# Patient Record
Sex: FEMALE | Race: OTHER
[De-identification: names, ages, dates, MRNs, and addresses within clinical notes are randomized per-mention and may not be internally consistent; named-entity substitution may affect disease eponyms.]

---

## 2018-08-24 ENCOUNTER — HOSPITAL ENCOUNTER (EMERGENCY)
Dept: HOSPITAL 72 - EMR | Age: 28
Discharge: HOME | End: 2018-08-24
Payer: COMMERCIAL

## 2018-08-24 VITALS — DIASTOLIC BLOOD PRESSURE: 60 MMHG | SYSTOLIC BLOOD PRESSURE: 96 MMHG

## 2018-08-24 VITALS — DIASTOLIC BLOOD PRESSURE: 64 MMHG | SYSTOLIC BLOOD PRESSURE: 102 MMHG

## 2018-08-24 VITALS — WEIGHT: 150 LBS | BODY MASS INDEX: 24.99 KG/M2 | HEIGHT: 65 IN

## 2018-08-24 VITALS — DIASTOLIC BLOOD PRESSURE: 61 MMHG | SYSTOLIC BLOOD PRESSURE: 93 MMHG

## 2018-08-24 VITALS — DIASTOLIC BLOOD PRESSURE: 117 MMHG | SYSTOLIC BLOOD PRESSURE: 134 MMHG

## 2018-08-24 VITALS — SYSTOLIC BLOOD PRESSURE: 108 MMHG | DIASTOLIC BLOOD PRESSURE: 64 MMHG

## 2018-08-24 DIAGNOSIS — I95.9: ICD-10-CM

## 2018-08-24 DIAGNOSIS — R00.0: ICD-10-CM

## 2018-08-24 DIAGNOSIS — R04.0: ICD-10-CM

## 2018-08-24 DIAGNOSIS — L76.22: Primary | ICD-10-CM

## 2018-08-24 DIAGNOSIS — D64.9: ICD-10-CM

## 2018-08-24 LAB
ADD MANUAL DIFF: NO
ADD MANUAL DIFF: NO
ANION GAP SERPL CALC-SCNC: 10 MMOL/L (ref 5–15)
APTT BLD: 26 SEC (ref 23–33)
BASOPHILS NFR BLD AUTO: 0.5 % (ref 0–2)
BASOPHILS NFR BLD AUTO: 1.1 % (ref 0–2)
BUN SERPL-MCNC: 11 MG/DL (ref 7–18)
CALCIUM SERPL-MCNC: 8.3 MG/DL (ref 8.5–10.1)
CHLORIDE SERPL-SCNC: 105 MMOL/L (ref 98–107)
CO2 SERPL-SCNC: 25 MMOL/L (ref 21–32)
CREAT SERPL-MCNC: 1 MG/DL (ref 0.55–1.3)
EOSINOPHIL NFR BLD AUTO: 0.8 % (ref 0–3)
EOSINOPHIL NFR BLD AUTO: 3.9 % (ref 0–3)
ERYTHROCYTE [DISTWIDTH] IN BLOOD BY AUTOMATED COUNT: 10.9 % (ref 11.6–14.8)
ERYTHROCYTE [DISTWIDTH] IN BLOOD BY AUTOMATED COUNT: 10.9 % (ref 11.6–14.8)
HCT VFR BLD CALC: 26.2 % (ref 37–47)
HCT VFR BLD CALC: 32.3 % (ref 37–47)
HGB BLD-MCNC: 11 G/DL (ref 12–16)
HGB BLD-MCNC: 8.9 G/DL (ref 12–16)
INR PPP: 1 (ref 0.9–1.1)
LYMPHOCYTES NFR BLD AUTO: 10.3 % (ref 20–45)
LYMPHOCYTES NFR BLD AUTO: 53.9 % (ref 20–45)
MCV RBC AUTO: 88 FL (ref 80–99)
MCV RBC AUTO: 88 FL (ref 80–99)
MONOCYTES NFR BLD AUTO: 4.3 % (ref 1–10)
MONOCYTES NFR BLD AUTO: 7.8 % (ref 1–10)
NEUTROPHILS NFR BLD AUTO: 33.4 % (ref 45–75)
NEUTROPHILS NFR BLD AUTO: 84.1 % (ref 45–75)
PLATELET # BLD: 219 K/UL (ref 150–450)
PLATELET # BLD: 328 K/UL (ref 150–450)
POTASSIUM SERPL-SCNC: 3.2 MMOL/L (ref 3.5–5.1)
RBC # BLD AUTO: 2.98 M/UL (ref 4.2–5.4)
RBC # BLD AUTO: 3.68 M/UL (ref 4.2–5.4)
SODIUM SERPL-SCNC: 140 MMOL/L (ref 136–145)
WBC # BLD AUTO: 11.5 K/UL (ref 4.8–10.8)
WBC # BLD AUTO: 16.5 K/UL (ref 4.8–10.8)

## 2018-08-24 PROCEDURE — 86850 RBC ANTIBODY SCREEN: CPT

## 2018-08-24 PROCEDURE — 85730 THROMBOPLASTIN TIME PARTIAL: CPT

## 2018-08-24 PROCEDURE — 86901 BLOOD TYPING SEROLOGIC RH(D): CPT

## 2018-08-24 PROCEDURE — 86900 BLOOD TYPING SEROLOGIC ABO: CPT

## 2018-08-24 PROCEDURE — 96374 THER/PROPH/DIAG INJ IV PUSH: CPT

## 2018-08-24 PROCEDURE — 96361 HYDRATE IV INFUSION ADD-ON: CPT

## 2018-08-24 PROCEDURE — 85025 COMPLETE CBC W/AUTO DIFF WBC: CPT

## 2018-08-24 PROCEDURE — 30901 CONTROL OF NOSEBLEED: CPT

## 2018-08-24 PROCEDURE — 80048 BASIC METABOLIC PNL TOTAL CA: CPT

## 2018-08-24 PROCEDURE — 99284 EMERGENCY DEPT VISIT MOD MDM: CPT

## 2018-08-24 PROCEDURE — 85610 PROTHROMBIN TIME: CPT

## 2018-08-24 PROCEDURE — 36415 COLL VENOUS BLD VENIPUNCTURE: CPT

## 2018-08-24 NOTE — EMERGENCY ROOM REPORT
History of Present Illness


General


Chief Complaint:  Nosebleed


Source:  Patient, Family Member, EMS





Present Illness


HPI


Is a 28-year-old female brought in by EMS for nosebleed.  She is status post 

one week from sinus surgery with septoplasty and submucosal resection of the 

turbinates.  She was doing well until just prior to arrival.  She woke up 

choking and had blood pouring out of her nose and mouth.  This occurred about 

20 minutes prior to arrival.  On arrival patient was in shock with tachycardia, 

heart rate 130s, hypotensive with systolic in the 70s and she was diaphoretic.  

Still has bleeding.  Bleeding was significant.  She soaked up a couple towels.  

Nauseous from the swallowed blood.  No fever chills but no trauma.  No nose 

picking or blowing her nose.


Allergies:  


Coded Allergies:  


     No Known Allergies (Unverified , 8/24/18)





Patient History


Past Medical History:  see triage record, old chart reviewed


Past Surgical History:  other


Pertinent Family History:  none


Social History:  Denies: smoking


Last Menstrual Period:  unknown


Pregnant Now:  No


Immunizations:  other


Reviewed Nursing Documentation:  PMH: Agreed; PSxH: Agreed





Nursing Documentation-PMH


Past Medical History:  No Stated History





Review of Systems


Eye:  Denies: eye pain, blurred vision


ENT:  Reports: other - nosebleed; Denies: ear pain, nose congestion, throat 

swelling


Respiratory:  Denies: cough, shortness of breath


Cardiovascular:  Denies: chest pain, palpitations


Gastrointestinal:  Denies: abdominal pain, diarrhea, nausea, vomiting


Musculoskeletal:  Denies: back pain, joint pain


Skin:  Denies: rash


Neurological:  Denies: headache, numbness


Endocrine:  Denies: increased thirst, increased urine


Hematologic/Lymphatic:  Denies: easy bruising


All Other Systems:  negative except mentioned in HPI





Physical Exam





Vital Signs








  Date Time  Temp Pulse Resp B/P (MAP) Pulse Ox O2 Delivery O2 Flow Rate FiO2


 


8/24/18 02:19 98.0 123 18 134/117 100 Room Air  





 98.1       





vitals showed blood pressure with systolic in the 80s..  Tachycardic


Sp02 EP Interpretation:  reviewed, normal


General Appearance:  alert, moderate distress


Head:  normocephalic, atraumatic


Eyes:  bilateral eye PERRL, bilateral eye EOMI


ENT:  hearing grossly normal, normal pharynx, other - There is oozing of blood 

bilaterally.  There is evidence of posterior bleed to the pharynx.


Neck:  full range of motion, supple, no meningismus


Respiratory:  chest non-tender, lungs clear, normal breath sounds


Cardiovascular #1:  regular rate, rhythm, no murmur


Gastrointestinal:  normal bowel sounds, non tender, no mass, no organomegaly, 

no bruit, non-distended


Musculoskeletal:  back normal, gait/station normal, normal range of motion


Psychiatric:  mood/affect normal


Skin:  warm/dry





Procedures


Additional Procedure


Procedure Narrative


Procedure: Nasal packing


Indication: Epistaxis


Description: Using a Ravi suction catheter, I suction the blood and clot.  

Bleeding appeared to slow down.  I then put in surgicel b/l.  Bleeding 

controlled.  There is no bleeding in the oropharynx.  She tolerated procedure 

without a problem.





Medical Decision Making


Diagnostic Impression:  


 Primary Impression:  


 Severe epistaxis


 Additional Impressions:  


 Post-op bleeding


 Qualified Codes:  L76.22 - Postprocedural hemorrhage of skin and subcutaneous 

tissue following other procedure


 Anemia


 Qualified Codes:  D64.9 - Anemia, unspecified


ER Course


Patient presents with severe epistaxis.  She lost a significant amount of 

blood.  She was diaphoretic and tachycardic and hypotensive.  Blood pressure 

improved after IV fluid.  Heart rate normalized.  Bleeding controlled.  I 

suspect that a scab fell off and there was some arterial bleeding.  This spasm 

down and no longer bleeding.





I paged Dr.Harvey Rosales, ENT.  So far, no call back.





Patient has been stable.  No bleeding for several hours now.  Again, I called 

Dr. Rosales.  Left a message at the office.  Also, Brigitte, his assistant, at [phone].  Left a message on her phone.  Patient is a [employer]hov[employer] witness so she 

cannot get blood products.  We'll put her on iron.  She did drop 2 g of 

hemoglobin.  Elevated white count is probably a stress response.  No evidence 

of any infection.  We'll discharge patient home with close follow-up with her 

ENT doctor later this morning.





Last Vital Signs








  Date Time  Temp Pulse Resp B/P (MAP) Pulse Ox O2 Delivery O2 Flow Rate FiO2


 


8/24/18 02:19 98.0 123 18 134/117 100 Room Air  





 98.1       








Status:  improved


Disposition:  HOME, SELF-CARE


Condition:  Stable


Scripts


Ferrous Sulfate* (FERROUS SULFATE*) 325 Mg Tablet


325 MG ORAL TWICE A DAY, #60 TAB 0 Refills


   Prov: DEVANTE CANTU M.D.         8/24/18


Referrals:  


NOT CHOSEN IPA/MD,REFERRING (PCP)


Patient Instructions:  Nosebleed, Easy-to-Read





Additional Instructions:  


Call Dr. Rosales and/or Brigitte later this morning.  Continue with postsurgical 

instruction.  Return if symptom worsen or more bleeding.











DEVANTE CANTU M.D. Aug 24, 2018 02:58

## 2018-08-30 ENCOUNTER — HOSPITAL ENCOUNTER (EMERGENCY)
Dept: HOSPITAL 72 - EMR | Age: 28
Discharge: ANOTHER HOSPITAL | End: 2018-08-30
Payer: COMMERCIAL

## 2018-08-30 VITALS — WEIGHT: 125 LBS | BODY MASS INDEX: 24.54 KG/M2 | HEIGHT: 60 IN

## 2018-08-30 VITALS — DIASTOLIC BLOOD PRESSURE: 52 MMHG | SYSTOLIC BLOOD PRESSURE: 101 MMHG

## 2018-08-30 VITALS — DIASTOLIC BLOOD PRESSURE: 49 MMHG | SYSTOLIC BLOOD PRESSURE: 101 MMHG

## 2018-08-30 VITALS — SYSTOLIC BLOOD PRESSURE: 72 MMHG | DIASTOLIC BLOOD PRESSURE: 37 MMHG

## 2018-08-30 VITALS — DIASTOLIC BLOOD PRESSURE: 61 MMHG | SYSTOLIC BLOOD PRESSURE: 103 MMHG

## 2018-08-30 DIAGNOSIS — R04.0: Primary | ICD-10-CM

## 2018-08-30 DIAGNOSIS — D64.9: ICD-10-CM

## 2018-08-30 LAB
ADD MANUAL DIFF: NO
ANION GAP SERPL CALC-SCNC: 10 MMOL/L (ref 5–15)
BASOPHILS NFR BLD AUTO: 1.7 % (ref 0–2)
BUN SERPL-MCNC: 10 MG/DL (ref 7–18)
CALCIUM SERPL-MCNC: 9.2 MG/DL (ref 8.5–10.1)
CHLORIDE SERPL-SCNC: 103 MMOL/L (ref 98–107)
CO2 SERPL-SCNC: 20 MMOL/L (ref 21–32)
CREAT SERPL-MCNC: 0.8 MG/DL (ref 0.55–1.3)
EOSINOPHIL NFR BLD AUTO: 2.9 % (ref 0–3)
ERYTHROCYTE [DISTWIDTH] IN BLOOD BY AUTOMATED COUNT: 13.4 % (ref 11.6–14.8)
HCT VFR BLD CALC: 24 % (ref 37–47)
HGB BLD-MCNC: 8.2 G/DL (ref 12–16)
LYMPHOCYTES NFR BLD AUTO: 52.1 % (ref 20–45)
MCV RBC AUTO: 90 FL (ref 80–99)
MONOCYTES NFR BLD AUTO: 7.4 % (ref 1–10)
NEUTROPHILS NFR BLD AUTO: 35.9 % (ref 45–75)
PLATELET # BLD: 410 K/UL (ref 150–450)
POTASSIUM SERPL-SCNC: 3.8 MMOL/L (ref 3.5–5.1)
RBC # BLD AUTO: 2.67 M/UL (ref 4.2–5.4)
SODIUM SERPL-SCNC: 133 MMOL/L (ref 136–145)
WBC # BLD AUTO: 13.8 K/UL (ref 4.8–10.8)

## 2018-08-30 PROCEDURE — 99284 EMERGENCY DEPT VISIT MOD MDM: CPT

## 2018-08-30 PROCEDURE — 96375 TX/PRO/DX INJ NEW DRUG ADDON: CPT

## 2018-08-30 PROCEDURE — 96361 HYDRATE IV INFUSION ADD-ON: CPT

## 2018-08-30 PROCEDURE — 80048 BASIC METABOLIC PNL TOTAL CA: CPT

## 2018-08-30 PROCEDURE — 36415 COLL VENOUS BLD VENIPUNCTURE: CPT

## 2018-08-30 PROCEDURE — 85025 COMPLETE CBC W/AUTO DIFF WBC: CPT

## 2018-08-30 PROCEDURE — 96374 THER/PROPH/DIAG INJ IV PUSH: CPT

## 2018-08-30 NOTE — EMERGENCY ROOM REPORT
Physical Exam





Vital Signs








  Date Time  Temp Pulse Resp B/P (MAP) Pulse Ox O2 Delivery O2 Flow Rate FiO2


 


8/30/18 04:23 96.9 114 28 45/29 91 Room Air  





 97.0       











Medical Decision Making


Diagnostic Impression:  


 Primary Impression:  


 Epistaxis


 Additional Impression:  


 Anemia


 Qualified Codes:  D64.9 - Anemia, unspecified


ER Course


I assumed care of patient went I came on duty at 630 am. The patient had 

further episodes vomiting mostly bloody fluid over the next hour. Sometimes it 

was not throwing up but clearing throat. There was small amounts of dripping 

fresh blood from the nose, but only a little. During this time the patient HR 

from 100's to 130's to 150's. SBP generally 70's, was as high as 100 after ~two 

liters LR. However patient continued to be diaphoretic and tachycardia 

worsening. We have no ENT at [place]. I attempted several things simultaneously 

right away. I spoke with pt.'s ENT Dr. Freeman who accepted patient to [facility] 

where he would see her in ED then OR. I spoke with Dr. Hdez at [facility] ED to 

alert to higher level of care necessary. I spoke with nursing supervisor at 

[facility] who would not assist really even with answering questions such as to 

whom would I speak about effecting transfer. I asked Charge RN Aide to call 

[facility] for higher level of care transfer. I spoke with the  at length as 

well as he was considering leaving AMA to go to [facility]. I asked Charge RN 

Aide to call [facility] to see if we could accomplish a transfer for higher level of 

care. While this was happening I was re-evaluating patient and in my 

professional opinion she was at higher and higher risk of hemorrhaging so much 

as to be life-threatening. This is a [employer] patient who is refusing 

blood which is what she desperately needed at this time. I spoke with my 

medical director quickly who agreed that if I think necessary I can call 

paramedics ([phone]) for higher level of care. I could not obtain a regular 

transfer as quickly as I believe the patient needed so I called paramedics. 

Delay on transport otherwise. Firefighters arrive and instead of [facility], where 

her surgeon is, I decided to send to [facility] as I thought the risk of 30 minutes 

in transport was too great and [facility] is ~5 minutes away. I considered intubating 

prior to leaving but really wanted her to get to [facility] as quick as possible and 

chance of high risk intubation with bleeding and probable/possible delay/

worsening. (There is no back-up here at [place].) I felt intubation could delay 

us up to ~ten minutes and possibly more. And her immediate problem was not aiway

; her problem is bleeding and she needed emergency transfusion (refused) and 

surgical attention for ENT bleeding source. She was very tachycardic but always 

100% pulse ox. And she was fatigued but she was oriented and able to 

communicate. So I believe delaying transfer for intubation would be a mistake. 

As soon as patient pulled away in ambulance I called [facility] ED and spoke with 

their ED doctor to tell them all that I knew and patient was pulling up to them 

right as I spoke with her. It was not possible to speak with [facility] transfer 

center sooner and I was on hold with them for ten minutes before I got their ED 

physician on the line. I was calling [phone] and [phone]. My 

intention was to call their house supervisor/transfer center after I spoke with 

[facility] ED doctor because it was more important to speak with ED doctor 

immediately. Pt. unstable for transfer but needed immediate higher level of 

care requiring paramedic immediate transport. Two large IV sites and LR hanging

, telemetry monitoring in place.





Last Vital Signs








  Date Time  Temp Pulse Resp B/P (MAP) Pulse Ox O2 Delivery O2 Flow Rate FiO2


 


8/30/18 07:09 97.8 163 25 72/37 100 Room Air  





 97.8       








Status:  worsened


Disposition:  XFER SHT-TRM HOSP


Condition:  Stable


Referrals:  


NON PHYSICIAN (PCP)


Patient Instructions:  Nosebleed, Easy-to-Read





Additional Instructions:  


Go directly to Dr. Louise's office.  Return if symptom worsen.











Duc Otoole M.D. Aug 30, 2018 09:01

## 2018-08-30 NOTE — EMERGENCY ROOM REPORT
History of Present Illness


General


Chief Complaint:  Nosebleed


Source:  Family Member, Medical Record, EMS





Present Illness


HPI


This is a 28-year-old female who had nasal surgery done 2 weeks ago.  I saw her 

last week for acute bleeding.  Drop of her hemoglobin of 11 to hemoglobin of 

8.9.  I suction the blood and packed the nose with Surgicel.  She was seen the 

same day at her ENT doctor's office.  Her , the doctor said there is no 

active bleeding.  Patient was discharged home with reassurance.  She presents 

tonight with acute bleeding.  Came on all of a sudden and she was choking on 

her blood.  She had large amount of blood per EMS and family.  At home her 

blood pressure was running systolic in the 70s and 80.  She normally runs low 

end of normal on her blood pressure.  When she came in she was diaphoretic and 

tachycardic and very hypotensive.  She was not responsive.


Allergies:  


Coded Allergies:  


     No Known Allergies (Unverified , 8/24/18)





Patient History


Past Medical History:  see triage record, old chart reviewed


Past Surgical History:  other


Pertinent Family History:  none


Social History:  Denies: smoking


Last Menstrual Period:  unk


Pregnant Now:  No


Immunizations:  other


Reviewed Nursing Documentation:  PMH: Agreed; PSxH: Agreed





Review of Systems


Eye:  Denies: eye pain, blurred vision


ENT:  Reports: other - nosebleed; Denies: ear pain, nose congestion, throat 

swelling


Respiratory:  Denies: cough, shortness of breath


Cardiovascular:  Denies: chest pain, palpitations


Gastrointestinal:  Denies: abdominal pain, diarrhea, nausea, vomiting


Musculoskeletal:  Denies: back pain, joint pain


Skin:  Denies: rash


Neurological:  Denies: headache, numbness


Endocrine:  Denies: increased thirst, increased urine


Hematologic/Lymphatic:  Denies: easy bruising


All Other Systems:  negative except mentioned in HPI





Physical Exam





Vital Signs








  Date Time  Temp Pulse Resp B/P (MAP) Pulse Ox O2 Delivery O2 Flow Rate FiO2


 


8/30/18 04:23 96.9 114 28 45/29 91 Room Air  





 97.0       





tachycardic, hypotensive


Sp02 EP Interpretation:  reviewed, normal


General Appearance:  moderate distress, other - pale and diaphoretic


Head:  normocephalic, atraumatic


Eyes:  bilateral eye PERRL, bilateral eye EOMI


ENT:  hearing grossly normal, normal pharynx


Neck:  full range of motion, supple, no meningismus


Respiratory:  chest non-tender, lungs clear, normal breath sounds


Cardiovascular #1:  regular rate, rhythm, no murmur


Gastrointestinal:  normal bowel sounds, non tender, no mass, no organomegaly, 

no bruit, non-distended


Musculoskeletal:  back normal, normal range of motion


Neurologic:  grossly normal


Skin:  warm/dry





Procedures


Additional Procedure


Procedure Narrative


Procedure: Epistaxis control


Indication: Epistaxis


Description: There was no active bleeding on visualization.  Instructions small 

clots at the naris opening.  I went ahead and put Surgicel in the left nostril.

  Patient tolerated procedure without a problem.





Medical Decision Making


Diagnostic Impression:  


 Primary Impression:  


 Epistaxis


 Additional Impression:  


 Anemia


 Qualified Codes:  D64.9 - Anemia, unspecified


ER Course


Patient presents with epistaxis.  It appears stopped now.  I had the  

called Dr. Freeman, ENT.  I spoke with him on the phone ([phone]). He said 

that when she was in the office he didn't see any active bleeding.  He is 

willing to see her first thing in the morning in the office.  He will scope her 

again.  He may admit her to [facility] so she can undergo anesthesia.  This way he 

can scope with for the back to see a source of bleeding.  Patient received 2 L 

of fluid and blood pressures responsive.  She still shivering and tachycardic.  

I will observe her for several hours and repeat hemoglobin.


Lab Results Impression


labs with anemia





Last Vital Signs








  Date Time  Temp Pulse Resp B/P (MAP) Pulse Ox O2 Delivery O2 Flow Rate FiO2


 


8/30/18 04:49 97.8 130 22 101/49 100 Room Air  





 97.8       








Status:  improved


Disposition:  HOME, SELF-CARE


Condition:  Stable


Referrals:  


NON PHYSICIAN (PCP)


Patient Instructions:  Nosebleed, Easy-to-Read





Additional Instructions:  


Go directly to Dr. Louise's office.  Return if symptom worsen.











DEVANTE CANTU M.D. Aug 30, 2018 05:14